# Patient Record
Sex: MALE | Race: BLACK OR AFRICAN AMERICAN | Employment: FULL TIME | URBAN - METROPOLITAN AREA
[De-identification: names, ages, dates, MRNs, and addresses within clinical notes are randomized per-mention and may not be internally consistent; named-entity substitution may affect disease eponyms.]

---

## 2018-07-23 ENCOUNTER — HOSPITAL ENCOUNTER (EMERGENCY)
Age: 28
Discharge: HOME | End: 2018-07-23
Attending: EMERGENCY MEDICINE
Payer: MEDICAID

## 2018-07-23 VITALS
OXYGEN SATURATION: 98 % | HEIGHT: 75 IN | RESPIRATION RATE: 10 BRPM | WEIGHT: 203.8 LBS | HEART RATE: 53 BPM | DIASTOLIC BLOOD PRESSURE: 91 MMHG | TEMPERATURE: 98.1 F | SYSTOLIC BLOOD PRESSURE: 133 MMHG | BODY MASS INDEX: 25.34 KG/M2

## 2018-07-23 DIAGNOSIS — K08.89 PAIN, DENTAL: Primary | ICD-10-CM

## 2018-07-23 PROCEDURE — 6370000000 HC RX 637 (ALT 250 FOR IP): Performed by: EMERGENCY MEDICINE

## 2018-07-23 PROCEDURE — 99282 EMERGENCY DEPT VISIT SF MDM: CPT

## 2018-07-23 RX ORDER — CLINDAMYCIN HYDROCHLORIDE 300 MG/1
300 CAPSULE ORAL 4 TIMES DAILY
Qty: 28 CAPSULE | Refills: 0 | Status: SHIPPED | OUTPATIENT
Start: 2018-07-23 | End: 2018-07-30

## 2018-07-23 RX ORDER — CLINDAMYCIN HYDROCHLORIDE 300 MG/1
300 CAPSULE ORAL ONCE
Status: COMPLETED | OUTPATIENT
Start: 2018-07-23 | End: 2018-07-23

## 2018-07-23 RX ORDER — IBUPROFEN 400 MG/1
800 TABLET ORAL ONCE
Status: COMPLETED | OUTPATIENT
Start: 2018-07-23 | End: 2018-07-23

## 2018-07-23 RX ADMIN — CLINDAMYCIN HYDROCHLORIDE 300 MG: 300 CAPSULE ORAL at 15:27

## 2018-07-23 RX ADMIN — IBUPROFEN 800 MG: 400 TABLET ORAL at 15:27

## 2018-07-23 ASSESSMENT — PAIN SCALES - GENERAL
PAINLEVEL_OUTOF10: 8
PAINLEVEL_OUTOF10: 8

## 2018-07-23 ASSESSMENT — PAIN DESCRIPTION - PAIN TYPE: TYPE: ACUTE PAIN

## 2018-07-23 NOTE — ED PROVIDER NOTES
clear. Sclera non icteric. No conjunctival injection  ENT: Upper and lower second molars show percussion tenderness with no obvious caries no subluxation. Tenderness noted along the gumline both upper and lower as well as posterior. No induration no fluctuance no erythema. No elevation of floor of mouth. Oropharynx clear. Airway patent. No stridor. No asymmetry. TM's clear. NECK: Supple  LUNGS: Clear. Equal breath sounds bilaterally. CARDIOVASCULAR: RRR. No murmurs rubs or gallops. pulses equal  SKIN: Warm and dry. Physical Exam     RADIOLOGY  No orders to display       PROCEDURES  Procedures    ED COURSE/MDM  Patient seen and evaluated. Old records reviewed if pertinent. Labs and imaging reviewed and results discussed with patient. I considered Dental cellulitis, dental abscess, irreversible pulpitis, periodontitis. Plan of care discussed with patient or family as appropriate. Patient or family in agreement with plan. If discharged, patient was given scripts for the following medications. Discharge Medication List as of 7/23/2018  3:10 PM      START taking these medications    Details   clindamycin (CLEOCIN) 300 MG capsule Take 1 capsule by mouth 4 times daily for 7 days, Disp-28 capsule, R-0Print             CLINICAL IMPRESSION  1. Pain, dental        Blood pressure (!) 133/91, pulse 53, temperature 98.1 °F (36.7 °C), temperature source Oral, resp. rate 10, height 6' 3\" (1.905 m), weight 92.4 kg (203 lb 12.8 oz), SpO2 98 %. DISPOSITION  Yue Duncan was discharged in stable condition.                    Akosua Toussaint MD  07/23/18 0690

## 2018-08-15 ENCOUNTER — HOSPITAL ENCOUNTER (EMERGENCY)
Age: 28
Discharge: HOME | End: 2018-08-15
Attending: EMERGENCY MEDICINE
Payer: MEDICAID

## 2018-08-15 VITALS
WEIGHT: 200 LBS | BODY MASS INDEX: 25.67 KG/M2 | TEMPERATURE: 98.3 F | HEART RATE: 62 BPM | SYSTOLIC BLOOD PRESSURE: 142 MMHG | HEIGHT: 74 IN | OXYGEN SATURATION: 98 % | RESPIRATION RATE: 18 BRPM | DIASTOLIC BLOOD PRESSURE: 88 MMHG

## 2018-08-15 DIAGNOSIS — K08.89 PAIN, DENTAL: Primary | ICD-10-CM

## 2018-08-15 PROCEDURE — 4500000022 HC ED LEVEL 2 PROCEDURE

## 2018-08-15 PROCEDURE — 6370000000 HC RX 637 (ALT 250 FOR IP): Performed by: PHYSICIAN ASSISTANT

## 2018-08-15 PROCEDURE — 2500000003 HC RX 250 WO HCPCS: Performed by: PHYSICIAN ASSISTANT

## 2018-08-15 PROCEDURE — 99282 EMERGENCY DEPT VISIT SF MDM: CPT

## 2018-08-15 RX ORDER — BUPIVACAINE HYDROCHLORIDE 2.5 MG/ML
30 INJECTION, SOLUTION EPIDURAL; INFILTRATION; INTRACAUDAL ONCE
Status: COMPLETED | OUTPATIENT
Start: 2018-08-15 | End: 2018-08-15

## 2018-08-15 RX ORDER — PENICILLIN V POTASSIUM 500 MG/1
500 TABLET ORAL 4 TIMES DAILY
Qty: 28 TABLET | Refills: 0 | Status: SHIPPED | OUTPATIENT
Start: 2018-08-15 | End: 2018-08-15

## 2018-08-15 RX ORDER — IBUPROFEN 400 MG/1
800 TABLET ORAL ONCE
Status: COMPLETED | OUTPATIENT
Start: 2018-08-15 | End: 2018-08-15

## 2018-08-15 RX ORDER — PENICILLIN V POTASSIUM 500 MG/1
500 TABLET ORAL 4 TIMES DAILY
Qty: 28 TABLET | Refills: 0 | Status: SHIPPED | OUTPATIENT
Start: 2018-08-15 | End: 2018-08-22

## 2018-08-15 RX ADMIN — BUPIVACAINE HYDROCHLORIDE 75 MG: 2.5 INJECTION, SOLUTION EPIDURAL; INFILTRATION; INTRACAUDAL; PERINEURAL at 15:24

## 2018-08-15 RX ADMIN — IBUPROFEN 800 MG: 400 TABLET ORAL at 15:24

## 2018-08-15 ASSESSMENT — PAIN SCALES - GENERAL
PAINLEVEL_OUTOF10: 10
PAINLEVEL_OUTOF10: 10

## 2018-08-15 NOTE — ED PROVIDER NOTES
[address] ENCOUNTER          PHYSICIAN ASSISTANT NOTE       Date of evaluation: 8/15/2018    Chief Complaint     Dental Pain    History of Present Illness     Adam Whitmore is a 32 y.o. male who presents with chief complaint of dental pain. Patient reports he has had dental pain to the right lower molar for the past two days. Reports that he has a hole in the tooth which has been present for a few months with acute exacerbation of pain the past two days. Reports pain when eating to this tooth. Reports feeling febrile, no documented temperatures. Denies trouble swallowing or shortness of breath. Review of Systems     Review of Systems   Constitutional: Positive for fever (subjective). Negative for chills, diaphoresis and fatigue. Past Medical, Surgical, Family, and Social History     He has a past medical history of Chronic chest pain and Migraines. He has no past surgical history on file. His family history is not on file. He reports that he has been smoking Cigarettes. He has been smoking about 1.00 pack per day. He has never used smokeless tobacco. He reports that he drinks about 3.0 oz of alcohol per week . He reports that he uses drugs, including Marijuana. Medications     Discharge Medication List as of 8/15/2018  4:11 PM      CONTINUE these medications which have NOT CHANGED    Details   ibuprofen (ADVIL) 200 MG CAPS Take 1 capsule by mouthHistorical Med             Allergies     He has No Known Allergies. Physical Exam     INITIAL VITALS: BP: (!) 142/88, Temp: 98.3 °F (36.8 °C), Pulse: 62, Resp: 18, SpO2: 98 %   Physical Exam   Constitutional: He is oriented to person, place, and time. He appears well-developed and well-nourished. No distress. HENT:   Head: Normocephalic and atraumatic. Mouth/Throat: Uvula is midline and mucous membranes are normal. No trismus in the jaw. Dental caries present. No dental abscesses or uvula swelling.  No

## 2018-08-15 NOTE — ED NOTES
Pt discharged to home. Pt verbalized understanding of discharged instructions. Pt left ED ambulatory without incident.       Tyree Zambrano RN  08/15/18 0641

## 2018-08-19 ENCOUNTER — HOSPITAL ENCOUNTER (EMERGENCY)
Age: 28
Discharge: HOME | End: 2018-08-20
Attending: EMERGENCY MEDICINE
Payer: MEDICAID

## 2018-08-19 VITALS
HEIGHT: 74 IN | WEIGHT: 200 LBS | DIASTOLIC BLOOD PRESSURE: 98 MMHG | RESPIRATION RATE: 14 BRPM | BODY MASS INDEX: 25.67 KG/M2 | HEART RATE: 66 BPM | SYSTOLIC BLOOD PRESSURE: 152 MMHG | OXYGEN SATURATION: 98 % | TEMPERATURE: 98.1 F

## 2018-08-19 DIAGNOSIS — K08.89 PAIN, DENTAL: Primary | ICD-10-CM

## 2018-08-19 PROCEDURE — 4500000022 HC ED LEVEL 2 PROCEDURE

## 2018-08-19 PROCEDURE — 99282 EMERGENCY DEPT VISIT SF MDM: CPT

## 2018-08-19 RX ORDER — BUPIVACAINE HYDROCHLORIDE 2.5 MG/ML
30 INJECTION, SOLUTION EPIDURAL; INFILTRATION; INTRACAUDAL ONCE
Status: COMPLETED | OUTPATIENT
Start: 2018-08-20 | End: 2018-08-20

## 2018-08-19 ASSESSMENT — PAIN SCALES - GENERAL: PAINLEVEL_OUTOF10: 10

## 2018-08-19 ASSESSMENT — PAIN DESCRIPTION - PAIN TYPE: TYPE: ACUTE PAIN

## 2018-08-19 ASSESSMENT — PAIN DESCRIPTION - ORIENTATION: ORIENTATION: LEFT;RIGHT

## 2018-08-19 ASSESSMENT — PAIN DESCRIPTION - DESCRIPTORS: DESCRIPTORS: STABBING;RADIATING;SHARP

## 2018-08-19 ASSESSMENT — PAIN DESCRIPTION - FREQUENCY: FREQUENCY: CONTINUOUS

## 2018-08-19 ASSESSMENT — PAIN DESCRIPTION - LOCATION: LOCATION: MOUTH

## 2018-08-20 PROCEDURE — 6370000000 HC RX 637 (ALT 250 FOR IP): Performed by: STUDENT IN AN ORGANIZED HEALTH CARE EDUCATION/TRAINING PROGRAM

## 2018-08-20 PROCEDURE — 2500000003 HC RX 250 WO HCPCS: Performed by: STUDENT IN AN ORGANIZED HEALTH CARE EDUCATION/TRAINING PROGRAM

## 2018-08-20 RX ORDER — IBUPROFEN 400 MG/1
TABLET ORAL
Status: DISCONTINUED
Start: 2018-08-20 | End: 2018-08-20 | Stop reason: HOSPADM

## 2018-08-20 RX ORDER — IBUPROFEN 200 MG
600 TABLET ORAL EVERY 8 HOURS PRN
Qty: 30 TABLET | Refills: 0 | Status: SHIPPED | OUTPATIENT
Start: 2018-08-20 | End: 2019-05-22 | Stop reason: SDUPTHER

## 2018-08-20 RX ADMIN — BUPIVACAINE HYDROCHLORIDE 75 MG: 2.5 INJECTION, SOLUTION EPIDURAL; INFILTRATION; INTRACAUDAL at 00:56

## 2018-08-20 RX ADMIN — IBUPROFEN 600 MG: 200 TABLET, FILM COATED ORAL at 00:07

## 2018-08-20 ASSESSMENT — PAIN SCALES - GENERAL
PAINLEVEL_OUTOF10: 10
PAINLEVEL_OUTOF10: 10

## 2018-08-20 NOTE — ED PROVIDER NOTES
[address] ENCOUNTER          EM RESIDENT NOTE       Date of evaluation: 8/19/2018    Chief Complaint     Dental Pain (bilateral, on abx for this )      History of Present Illness     Gust Curling is a 32 y.o. male who presents For evaluation of dental pain. Patient states that this has been ongoing for the past couple weeks. Patient states that he had a dental block done 4 days ago. He states that he went to see a dentist but they were not able to do anything since he was still taking his penicillin. He is returning for pain that is now also involving the left side. Patient denies any fevers or chills. He is doing able to eat and drink. Review of Systems     Review of Systems  No fever, chills, sweats. No chest pain, shortness of breath or vomiting. Past Medical, Surgical, Family, and Social History     He has a past medical history of Chronic chest pain and Migraines. He has no past surgical history on file. His family history is not on file. He reports that he has been smoking Cigarettes. He has been smoking about 1.00 pack per day. He has never used smokeless tobacco. He reports that he drinks about 3.0 oz of alcohol per week . He reports that he uses drugs, including Marijuana. Medications     Previous Medications    IBUPROFEN (ADVIL) 200 MG CAPS    Take 1 capsule by mouth    PENICILLIN V POTASSIUM (VEETID) 500 MG TABLET    Take 1 tablet by mouth 4 times daily for 7 days       Allergies     He has No Known Allergies. Physical Exam     INITIAL VITALS: BP: (!) 152/98, Temp: 98.1 °F (36.7 °C), Pulse: 66, Resp: 14, SpO2: 98 %   Physical Exam   Constitutional: He appears well-developed and well-nourished. HENT:   Head: Normocephalic and atraumatic. Mouth/Throat: Oropharynx is clear and moist.   Tenderness to palpation of the right lower molar. Cavity is seen to that tooth. No evidence of abscess or fluctuant mass.    Cardiovascular: Normal rate and regular rhythm. Vitals reviewed. Diagnostic Results       RADIOLOGY:  No orders to display       LABS:   No results found for this visit on 08/19/18. RECENT VITALS:  BP: (!) 152/98, Temp: 98.1 °F (36.7 °C), Pulse: 66, Resp: 14, SpO2: 98 %     Procedures     Dental Nerve Block Procedure  Date/Time: 8/20/2018 12:05 AM  Performed by: Xavier Rich  Authorized by: Con Ramirez   Consent: Verbal consent obtained. Consent given by: patient  Patient understanding: patient states understanding of the procedure being performed  Patient consent: the patient's understanding of the procedure matches consent given  Procedure consent: procedure consent matches procedure scheduled  Relevant documents: relevant documents present and verified  Site marked: the operative site was marked  Imaging studies: imaging studies not available  Patient identity confirmed: verbally with patient and arm band  Local anesthesia used: yes  Anesthesia: local infiltration    Anesthesia:  Local anesthesia used: yes  Local Anesthetic: bupivacaine 0.25% with epinephrine  Anesthetic total: 6 mL    Sedation:  Patient sedated: no  Patient tolerance: Patient tolerated the procedure well with no immediate complications          ED Course     Nursing Notes, Past Medical Hx, Past Surgical Hx, Social Hx, Allergies, and Family Hx were reviewed. The patient was given the following medications:  No orders of the defined types were placed in this encounter. CONSULTS:  None    MEDICAL DECISION MAKING / ASSESSMENT / Vik Stacie is a 32 y.o. male with a history of dental cavities and presents for evaluation of dental pain. Patient states he was in 4 days ago and had a dental block done on the right side. He started having pain on the left side the next day. The patient went to the dentist and was told to finish his antibiotic course. He is here today because his pain is back. Upon arrival the patient was afebrile.   He

## 2019-05-22 ENCOUNTER — APPOINTMENT (OUTPATIENT)
Dept: GENERAL RADIOLOGY | Age: 29
End: 2019-05-22
Payer: MEDICAID

## 2019-05-22 ENCOUNTER — HOSPITAL ENCOUNTER (EMERGENCY)
Age: 29
Discharge: HOME | End: 2019-05-22
Attending: EMERGENCY MEDICINE
Payer: MEDICAID

## 2019-05-22 VITALS
OXYGEN SATURATION: 98 % | BODY MASS INDEX: 28.43 KG/M2 | WEIGHT: 221.5 LBS | HEIGHT: 74 IN | TEMPERATURE: 98.9 F | DIASTOLIC BLOOD PRESSURE: 82 MMHG | SYSTOLIC BLOOD PRESSURE: 149 MMHG | HEART RATE: 92 BPM | RESPIRATION RATE: 12 BRPM

## 2019-05-22 DIAGNOSIS — S93.401A SPRAIN OF RIGHT ANKLE, UNSPECIFIED LIGAMENT, INITIAL ENCOUNTER: Primary | ICD-10-CM

## 2019-05-22 PROCEDURE — 73610 X-RAY EXAM OF ANKLE: CPT

## 2019-05-22 PROCEDURE — 73590 X-RAY EXAM OF LOWER LEG: CPT

## 2019-05-22 PROCEDURE — 99283 EMERGENCY DEPT VISIT LOW MDM: CPT

## 2019-05-22 RX ORDER — IBUPROFEN 200 MG
600 TABLET ORAL EVERY 8 HOURS PRN
Qty: 30 TABLET | Refills: 0 | Status: SHIPPED | OUTPATIENT
Start: 2019-05-22 | End: 2020-07-12

## 2019-05-22 ASSESSMENT — PAIN DESCRIPTION - LOCATION
LOCATION: ANKLE
LOCATION: ANKLE

## 2019-05-22 ASSESSMENT — PAIN DESCRIPTION - ORIENTATION
ORIENTATION: RIGHT
ORIENTATION: RIGHT

## 2019-05-22 ASSESSMENT — PAIN DESCRIPTION - DESCRIPTORS: DESCRIPTORS: TIGHTNESS

## 2019-05-22 ASSESSMENT — PAIN SCALES - GENERAL
PAINLEVEL_OUTOF10: 7
PAINLEVEL_OUTOF10: 7

## 2019-05-22 ASSESSMENT — PAIN DESCRIPTION - PAIN TYPE
TYPE: ACUTE PAIN
TYPE: ACUTE PAIN

## 2019-05-22 NOTE — ED PROVIDER NOTES
Emergency Physician Note    Chief Complaint  Ankle Injury       History of Present Illness  Alex Ibrahim is a 29 y.o. male who presents to the ED for ankle injury. Patient reports 2 days ago he injured his right foot while wearing socks on a porch and seemed to slip. Since then he's had pain with range of motion of the right foot and ankle and the pain seems to go into the calf as well. He denies any other injuries. 10 systems reviewed, pertinent positives per HPI otherwise noted to be negative    I have reviewed the following from the nursing documentation:      Prior to Admission medications    Medication Sig Start Date End Date Taking? Authorizing Provider   ibuprofen (ADVIL;MOTRIN) 200 MG tablet Take 3 tablets by mouth every 8 hours as needed for Pain 8/20/18  Yes Kristina Reeves MD       Allergies as of 05/22/2019    (No Known Allergies)       Past Medical History:   Diagnosis Date    Chronic chest pain     Migraines         Surgical History: History reviewed. No pertinent surgical history. Family History:  History reviewed. No pertinent family history.     Social History     Socioeconomic History    Marital status: Single     Spouse name: Not on file    Number of children: Not on file    Years of education: Not on file    Highest education level: Not on file   Occupational History    Not on file   Social Needs    Financial resource strain: Not on file    Food insecurity:     Worry: Not on file     Inability: Not on file    Transportation needs:     Medical: Not on file     Non-medical: Not on file   Tobacco Use    Smoking status: Current Every Day Smoker     Packs/day: 1.00     Types: Cigarettes    Smokeless tobacco: Never Used    Tobacco comment: daily   Substance and Sexual Activity    Alcohol use: Yes     Comment: 2 times week    Drug use: Yes     Types: Marijuana     Comment: every other day    Sexual activity: Yes     Partners: Female   Lifestyle    Physical activity: Days per week: Not on file     Minutes per session: Not on file    Stress: Not on file   Relationships    Social connections:     Talks on phone: Not on file     Gets together: Not on file     Attends [employer] service: Not on file     Active member of club or organization: Not on file     Attends meetings of clubs or organizations: Not on file     Relationship status: Not on file    Intimate partner violence:     Fear of current or ex partner: Not on file     Emotionally abused: Not on file     Physically abused: Not on file     Forced sexual activity: Not on file   Other Topics Concern    Not on file   Social History Narrative    Not on file       Nursing notes reviewed. ED Triage Vitals [05/22/19 1852]   Enc Vitals Group      BP (!) 149/82      Pulse 92      Resp 12      Temp 98.9 °F (37.2 °C)      Temp Source Oral      SpO2 98 %      Weight 221 lb 8 oz (100.5 kg)      Height 6' 2\" (1.88 m)      Head Circumference       Peak Flow       Pain Score       Pain Loc       Pain Edu? Excl. in [address]? GENERAL:  Awake, alert. Well developed, well nourished with no apparent distress. HENT:  Normocephalic, Atraumatic, moist mucous membranes. EXTREMITIES:  Normal range of motion, no edema, tender over the proximal fibula and lateral calf, no Achilles tenderness or defect, negative Laboy test, pain elicited also with axial loading of the ankle joint, neurovascularly intact to the toes, no deformity, distal pulses present. BACK:  No tenderness. SKIN: Warm, dry and intact. NEUROLOGIC: Normal mental status. Moving all extremities to command. RADIOLOGY  X-RAYS:  I have reviewed radiologic plain film image(s). ALL OTHER NON-PLAIN FILM IMAGES SUCH AS CT, ULTRASOUND AND MRI HAVE BEEN READ BY THE RADIOLOGIST. XR ANKLE RIGHT (MIN 3 VIEWS)   Final Result      Soft tissue swelling over lateral malleolus. No fracture or dislocation.             Right tibia fibula 2 view      HISTORY: 29year-old the [address] [place] [address] dictation system. I created this record but it may contain dictation errors.           Brian Field MD  05/23/19 4307

## 2020-07-12 ENCOUNTER — HOSPITAL ENCOUNTER (EMERGENCY)
Age: 30
Discharge: HOME | End: 2020-07-12
Attending: EMERGENCY MEDICINE

## 2020-07-12 VITALS
WEIGHT: 229.9 LBS | DIASTOLIC BLOOD PRESSURE: 111 MMHG | RESPIRATION RATE: 17 BRPM | OXYGEN SATURATION: 98 % | BODY MASS INDEX: 29.52 KG/M2 | HEART RATE: 90 BPM | TEMPERATURE: 98.6 F | SYSTOLIC BLOOD PRESSURE: 161 MMHG

## 2020-07-12 PROCEDURE — 99282 EMERGENCY DEPT VISIT SF MDM: CPT

## 2020-07-12 RX ORDER — IBUPROFEN 600 MG/1
600 TABLET ORAL EVERY 8 HOURS PRN
Qty: 20 TABLET | Refills: 0 | Status: SHIPPED | OUTPATIENT
Start: 2020-07-12

## 2020-07-12 RX ORDER — PENICILLIN V POTASSIUM 500 MG/1
500 TABLET ORAL 4 TIMES DAILY
Qty: 40 TABLET | Refills: 0 | Status: SHIPPED | OUTPATIENT
Start: 2020-07-12 | End: 2020-07-22

## 2020-07-12 ASSESSMENT — PAIN DESCRIPTION - PAIN TYPE: TYPE: ACUTE PAIN

## 2020-07-12 ASSESSMENT — PAIN DESCRIPTION - LOCATION: LOCATION: THROAT

## 2020-07-13 ENCOUNTER — CARE COORDINATION (OUTPATIENT)
Dept: CARE COORDINATION | Age: 30
End: 2020-07-13

## 2020-07-13 NOTE — CARE COORDINATION
Patient contacted regarding Julius Pruitt. Discussed COVID-19 related testing which was not done at this time. Test results were not done. Patient informed of results, if available? N/A    Care Transition Nurse/ Ambulatory Care Manager contacted the patient by telephone to perform post discharge assessment. Verified name and  with patient as identifiers. Provided introduction to self, and explanation of the CTN/ACM role, and reason for call due to risk factors for infection and/or exposure to COVID-19. Symptoms reviewed with patient who verbalized the following symptoms: no new symptoms and no worsening symptoms. Due to no new or worsening symptoms encounter was not routed to provider for escalation. Discussed follow-up appointments. If no appointment was previously scheduled, appointment scheduling offered: Yes. [address] spoke with Gamaliel Glover and he agreed to contact pt to schedule. [address] follow up appointment(s):   Non-[facility] follow up appointment(s):      Patient has following risk factors of: no known risk factors. CTN/ACM reviewed discharge instructions, medical action plan and red flags such as increased shortness of breath, increasing fever and signs of decompensation with patient who verbalized understanding. Discussed exposure protocols and quarantine with CDC Guidelines What to do if you are sick with coronavirus disease .  Patient was given an opportunity for questions and concerns. The patient agrees to contact the [facility] exposure line [phone] and PCP office for questions related to their healthcare. CTN/ACM provided contact information for future needs.     Reviewed and educated patient on any new and changed medications related to discharge diagnosis     Patient/family/caregiver given information for [facility] Loop and agrees to enroll no  Patient's preferred e-mail:  no  Patient's preferred phone number:  Based on Loop alert triggers, patient will be contacted by nurse care manager for worsening symptoms. Plan for follow-up call in 5-7 days based on severity of symptoms and risk factors. TAYLOR spoke with Viraj moya. He was agreeable to schedule f/u at [facility] with a new PCP. Horace Romano at the [facility] will contact Viraj moya to schedule. He was agreeable to contact [facility] line with questions. He has started his PCN and Ibuprofen, Tylenol . Denies fever but reports sweats and chills during the night. Verbalized understanding of symptoms to monitor and follow up plan. Made aware that ED f/u visit needed.

## 2020-07-20 ENCOUNTER — CARE COORDINATION (OUTPATIENT)
Dept: CARE COORDINATION | Age: 30
End: 2020-07-20

## 2021-12-08 ENCOUNTER — HOSPITAL ENCOUNTER (EMERGENCY)
Age: 31
Discharge: HOME | End: 2021-12-08
Attending: EMERGENCY MEDICINE

## 2021-12-08 VITALS
BODY MASS INDEX: 29.44 KG/M2 | WEIGHT: 229.44 LBS | OXYGEN SATURATION: 99 % | HEIGHT: 74 IN | TEMPERATURE: 98.3 F | DIASTOLIC BLOOD PRESSURE: 102 MMHG | SYSTOLIC BLOOD PRESSURE: 154 MMHG | RESPIRATION RATE: 12 BRPM | HEART RATE: 70 BPM

## 2021-12-08 DIAGNOSIS — N34.2 URETHRITIS: Primary | ICD-10-CM

## 2021-12-08 DIAGNOSIS — A63.0 GENITAL WARTS: ICD-10-CM

## 2021-12-08 LAB
BACTERIA: ABNORMAL /HPF
BILIRUBIN URINE: NEGATIVE
BLOOD, URINE: NEGATIVE
CLARITY: CLEAR
COLOR: YELLOW
EPITHELIAL CELLS, UA: ABNORMAL /HPF (ref 0–5)
GLUCOSE URINE: NEGATIVE MG/DL
KETONES, URINE: NEGATIVE MG/DL
LEUKOCYTE ESTERASE, URINE: ABNORMAL
MICROSCOPIC EXAMINATION: YES
NITRITE, URINE: NEGATIVE
PH UA: 6 (ref 5–8)
PROTEIN UA: NEGATIVE MG/DL
RBC UA: ABNORMAL /HPF (ref 0–4)
SPECIFIC GRAVITY UA: 1.02 (ref 1–1.03)
URINE TYPE: ABNORMAL
UROBILINOGEN, URINE: 0.2 E.U./DL
WBC UA: ABNORMAL /HPF (ref 0–5)

## 2021-12-08 PROCEDURE — 6370000000 HC RX 637 (ALT 250 FOR IP): Performed by: EMERGENCY MEDICINE

## 2021-12-08 PROCEDURE — 87591 N.GONORRHOEAE DNA AMP PROB: CPT

## 2021-12-08 PROCEDURE — 99285 EMERGENCY DEPT VISIT HI MDM: CPT

## 2021-12-08 PROCEDURE — 81001 URINALYSIS AUTO W/SCOPE: CPT

## 2021-12-08 PROCEDURE — 6360000002 HC RX W HCPCS: Performed by: EMERGENCY MEDICINE

## 2021-12-08 PROCEDURE — 96372 THER/PROPH/DIAG INJ SC/IM: CPT

## 2021-12-08 PROCEDURE — 87491 CHLMYD TRACH DNA AMP PROBE: CPT

## 2021-12-08 PROCEDURE — 2500000003 HC RX 250 WO HCPCS: Performed by: EMERGENCY MEDICINE

## 2021-12-08 RX ORDER — METRONIDAZOLE 500 MG/1
2000 TABLET ORAL ONCE
Status: COMPLETED | OUTPATIENT
Start: 2021-12-08 | End: 2021-12-08

## 2021-12-08 RX ORDER — IMIQUIMOD 12.5 MG/.25G
CREAM TOPICAL
Qty: 24 EACH | Refills: 1 | Status: SHIPPED | OUTPATIENT
Start: 2021-12-08 | End: 2023-01-18

## 2021-12-08 RX ORDER — AZITHROMYCIN 250 MG/1
1000 TABLET, FILM COATED ORAL ONCE
Status: COMPLETED | OUTPATIENT
Start: 2021-12-08 | End: 2021-12-08

## 2021-12-08 RX ADMIN — METRONIDAZOLE 2000 MG: 500 TABLET ORAL at 17:42

## 2021-12-08 RX ADMIN — LIDOCAINE HYDROCHLORIDE 250 MG: 10 INJECTION, SOLUTION INFILTRATION; PERINEURAL at 17:44

## 2021-12-08 RX ADMIN — AZITHROMYCIN MONOHYDRATE 1000 MG: 250 TABLET ORAL at 17:42

## 2021-12-08 NOTE — ED PROVIDER NOTES
Emergency Department Provider Note  Location: [address] [place] [address]  12/8/2021     Patient Identification  Huey Scott is a 32 y.o. male    Chief Complaint  Exposure to STD      Mode of Arrival  private car    HPI  (History provided by patient)  This is a 32 y.o. male presented today for penile discharge and exposure to STD. Patient said he got a phone call from his ex-girlfriend about 3-4 days ago that she tested positive for trichomonas. Patient said he noticed slight dysuria and penile discharge. He also noticed some lesions on the side of the penis. Denies testicular pain or scrotal swelling. No fever. No hematuria. ROS  Review of Systems   Constitutional: Negative for fever. Gastrointestinal: Negative for abdominal pain, nausea, rectal pain and vomiting. Genitourinary: Positive for dysuria and penile discharge. Negative for hematuria, scrotal swelling and testicular pain. Musculoskeletal: Negative for back pain. I have reviewed the following nursing documentation:  Allergies: No Known Allergies    Past medical history:  has a past medical history of Chronic chest pain and Migraines. Past surgical history:  has no past surgical history on file. Home medications:   Prior to Admission medications    Medication Sig Start Date End Date Taking? Authorizing Provider   ibuprofen (IBU) 600 MG tablet Take 1 tablet by mouth every 8 hours as needed for Pain or Fever (with food) 7/12/20   Pastor Mccord MD       Social history:  reports that he has been smoking cigarettes. He has been smoking about 1.00 pack per day. He has never used smokeless tobacco. He reports current alcohol use. He reports current drug use. Drug: Marijuana [place]). Family history:  History reviewed. No pertinent family history.     Exam  ED Triage Vitals [12/08/21 1554]   BP Temp Temp Source Pulse Resp SpO2 Height Weight   (!) 154/102 98.3 °F (36.8 °C) Oral 70 12 99 % 6' 2\" (1.88 m) 229 lb 7 oz (104.1 kg) Physical Exam  Vitals and nursing note reviewed. Constitutional:       General: He is not in acute distress. Appearance: Normal appearance. He is well-developed. He is not diaphoretic. HENT:      Head: Normocephalic and atraumatic. Eyes:      General: No scleral icterus. Right eye: No discharge. Left eye: No discharge. Neck:      Trachea: No tracheal deviation. Pulmonary:      Effort: Pulmonary effort is normal. No respiratory distress. Abdominal:      General: There is no distension. Tenderness: There is no abdominal tenderness. There is no guarding or rebound. Genitourinary:     Penis: Circumcised. Lesions (verruca lesions noted on the lateral side of the penis) present. Testes:         Right: Tenderness or swelling not present. Left: Tenderness or swelling not present. Lymphadenopathy:      Lower Body: No right inguinal adenopathy. No left inguinal adenopathy. Skin:     General: Skin is warm and dry. Coloration: Skin is not pale. Comments: Genital lesions concerning for wart on the penis   Neurological:      General: No focal deficit present. Mental Status: He is alert and oriented to person, place, and time. Cranial Nerves: No dysarthria. Motor: Motor function is intact.    Psychiatric:         Behavior: Behavior normal.               MDM/ED Course  ED Medication Orders (From admission, onward)    Start Ordered     Status Ordering Provider    12/08/21 1715 12/08/21 1706  azithromycin (ZITHROMAX) tablet 1,000 mg  ONCE        Question:  Antimicrobial Indications  Answer:  STD infection    Last MAR action: Given - by Kathleen Black on 12/08/21 at [facility]    12/08/21 1715 12/08/21 1706  cefTRIAXone (ROCEPHIN) 250 mg in lidocaine 1 % 0.5 mL IM Injection  ONCE        Question:  Antimicrobial Indications  Answer:  STD infection    Last MAR action: Given - by Kathleen Black on 12/08/21 at [facility]    12/08/21 1715 12/08/21 1706  metroNIDAZOLE (FLAGYL) tablet 2,000 mg  ONCE        Question:  Antimicrobial Indications  Answer:  STD infection    Last MAR action: Given - by Grant Alexander on 12/08/21 at [facility] [place], [place]          - Patient seen and evaluated in room 6.  32 y.o. male presented for STD treatment after his ex-girlfriend tested positive for STD (trichomonas). Since patient is not sure if ex-girlfriend also tested positive for gonorrhea and chlamydia, we agreed to treat all 3.  - in addition, on exam, patient has lesions that are concerning for HPV infection, genital wart. We will initiate treatment but will refer the patient to dermatology for follow-up and further evaluation.   - Return precautions also discussed. patient verbalized understanding of care plan and agreed to follow-up with PCP and dermatology as advised. I estimate there is LOW risk for TESTICULAR TORSION, INCARCERATED HERNIA, AMY'S GANGRENE, PHISMOSIS, PARAPHISMOSIS, PENILE FRACTURE, and PRIAPISM, thus I consider the discharge disposition reasonable. Also, there is no evidence or peritonitis, sepsis, or toxicity. Trinity Finn and I have discussed the diagnosis and risks, and we agree with discharging home to follow-up with their primary doctor. We also discussed returning to the Emergency Department immediately if new or worsening symptoms occur. We have discussed the symptoms which are most concerning (e.g., bloody stool, fever, changing or worsening pain, vomiting) that necessitate immediate return. Clinical Impression:  1. Urethritis    2. Genital warts        Disposition:  Discharge to home in good condition. Blood pressure (!) 154/102, pulse 70, temperature 98.3 °F (36.8 °C), temperature source Oral, resp. rate 12, height 6' 2\" (1.88 m), weight 229 lb 7 oz (104.1 kg), SpO2 99 %. Patient was given scripts for the following medications. I counseled patient how to take these medications.    Discharge Medication List as of 12/8/2021 6:02 PM      START taking these medications    Details   imiquimod (ALDARA) 5 % cream Apply topically three times a week (for example: Monday, Wednesday, Friday) for 6 weeks, Disp-24 each, R-1, Print             Disposition referral (if applicable):  Ana Ochoa  [phone]  Schedule an appointment as soon as possible for a visit   You have been referred to primary care for follow-up    John Rivas MD  [address] [address]  [phone]    Schedule an appointment as soon as possible for a visit   dermatology - for your genital wart        This chart was generated in part by using Dragon Dictation system and may contain errors related to that system including errors in grammar, punctuation, and spelling, as well as words and phrases that may be inappropriate. If there are any questions or concerns please feel free to contact the dictating provider for clarification.      Terrell Garza MD  [address] Amos Gilbert MD  12/13/21 1323

## 2021-12-10 LAB
C. TRACHOMATIS DNA ,URINE: NEGATIVE
N. GONORRHOEAE DNA, URINE: NEGATIVE

## 2021-12-13 ASSESSMENT — ENCOUNTER SYMPTOMS
RECTAL PAIN: 0
NAUSEA: 0
ABDOMINAL PAIN: 0
VOMITING: 0
BACK PAIN: 0

## 2023-08-25 ENCOUNTER — HOSPITAL ENCOUNTER (EMERGENCY)
Age: 33
Discharge: HOME | End: 2023-08-25
Attending: EMERGENCY MEDICINE
Payer: COMMERCIAL

## 2023-08-25 VITALS
SYSTOLIC BLOOD PRESSURE: 147 MMHG | HEIGHT: 74 IN | TEMPERATURE: 98.5 F | DIASTOLIC BLOOD PRESSURE: 100 MMHG | RESPIRATION RATE: 10 BRPM | WEIGHT: 214.8 LBS | HEART RATE: 69 BPM | BODY MASS INDEX: 27.57 KG/M2 | OXYGEN SATURATION: 100 %

## 2023-08-25 DIAGNOSIS — Z20.2 STD EXPOSURE: Primary | ICD-10-CM

## 2023-08-25 LAB
BACTERIA URNS QL MICRO: ABNORMAL /HPF
BILIRUB UR QL STRIP.AUTO: NEGATIVE
CLARITY UR: CLEAR
COLOR UR: YELLOW
EPI CELLS #/AREA URNS HPF: ABNORMAL /HPF (ref 0–5)
GLUCOSE UR STRIP.AUTO-MCNC: NEGATIVE MG/DL
HGB UR QL STRIP.AUTO: NEGATIVE
KETONES UR STRIP.AUTO-MCNC: NEGATIVE MG/DL
LEUKOCYTE ESTERASE UR QL STRIP.AUTO: ABNORMAL
NITRITE UR QL STRIP.AUTO: NEGATIVE
PH UR STRIP.AUTO: 7 [PH] (ref 5–8)
PROT UR STRIP.AUTO-MCNC: NEGATIVE MG/DL
RBC #/AREA URNS HPF: ABNORMAL /HPF (ref 0–4)
SP GR UR STRIP.AUTO: 1.01 (ref 1–1.03)
UA DIPSTICK W REFLEX MICRO PNL UR: YES
URN SPEC COLLECT METH UR: ABNORMAL
UROBILINOGEN UR STRIP-ACNC: 1 E.U./DL
WBC #/AREA URNS HPF: ABNORMAL /HPF (ref 0–5)

## 2023-08-25 PROCEDURE — 6360000002 HC RX W HCPCS: Performed by: EMERGENCY MEDICINE

## 2023-08-25 PROCEDURE — 6370000000 HC RX 637 (ALT 250 FOR IP): Performed by: EMERGENCY MEDICINE

## 2023-08-25 PROCEDURE — 81001 URINALYSIS AUTO W/SCOPE: CPT

## 2023-08-25 PROCEDURE — 99284 EMERGENCY DEPT VISIT MOD MDM: CPT

## 2023-08-25 PROCEDURE — 96372 THER/PROPH/DIAG INJ SC/IM: CPT

## 2023-08-25 PROCEDURE — 87591 N.GONORRHOEAE DNA AMP PROB: CPT

## 2023-08-25 PROCEDURE — 87491 CHLMYD TRACH DNA AMP PROBE: CPT

## 2023-08-25 RX ORDER — CEFTRIAXONE 500 MG/1
500 INJECTION, POWDER, FOR SOLUTION INTRAMUSCULAR; INTRAVENOUS ONCE
Status: COMPLETED | OUTPATIENT
Start: 2023-08-25 | End: 2023-08-25

## 2023-08-25 RX ORDER — METRONIDAZOLE 500 MG/1
500 TABLET ORAL 2 TIMES DAILY
Qty: 14 TABLET | Refills: 0 | Status: SHIPPED | OUTPATIENT
Start: 2023-08-25 | End: 2023-09-01

## 2023-08-25 RX ORDER — DOXYCYCLINE HYCLATE 100 MG
100 TABLET ORAL 2 TIMES DAILY
Qty: 20 TABLET | Refills: 0 | Status: SHIPPED | OUTPATIENT
Start: 2023-08-25 | End: 2023-09-04

## 2023-08-25 RX ORDER — DOXYCYCLINE 100 MG/1
100 CAPSULE ORAL ONCE
Status: COMPLETED | OUTPATIENT
Start: 2023-08-25 | End: 2023-08-25

## 2023-08-25 RX ORDER — METRONIDAZOLE 500 MG/1
500 TABLET ORAL ONCE
Status: COMPLETED | OUTPATIENT
Start: 2023-08-25 | End: 2023-08-25

## 2023-08-25 RX ADMIN — DOXYCYCLINE 100 MG: 100 CAPSULE ORAL at 13:08

## 2023-08-25 RX ADMIN — CEFTRIAXONE SODIUM 500 MG: 500 INJECTION, POWDER, FOR SOLUTION INTRAMUSCULAR; INTRAVENOUS at 13:08

## 2023-08-25 RX ADMIN — METRONIDAZOLE 500 MG: 500 TABLET ORAL at 13:08

## 2023-08-25 ASSESSMENT — PAIN - FUNCTIONAL ASSESSMENT: PAIN_FUNCTIONAL_ASSESSMENT: NONE - DENIES PAIN

## 2023-08-25 NOTE — DISCHARGE INSTRUCTIONS
Please take your medications as prescribed. If your symptoms worsen despite treatment please come back to the ER for repeat evaluation. please refrain from sexual activity until you are fully treated which is approximately 7 days.

## 2023-08-25 NOTE — ED NOTES
Patient given prescription, discharge instructions verbal and written, patient verbalized understanding. Alert/oriented X4, Clear speech.   Patient exhibits no distress, ambulates with steady gait per self leaving unit, no further request.      Eldon Patel RN  08/25/23 2365

## 2023-08-25 NOTE — ED PROVIDER NOTES
[address] ENCOUNTER      Pt Name: Darian Winkler  MRN: [id number]  [address] 1990  Date of evaluation: 8/25/2023  Provider: Luly Cuello MD    CHIEF COMPLAINT       Chief Complaint   Patient presents with    Exposure to STD         HISTORY OF PRESENT ILLNESS   (Location/Symptom, Timing/Onset, Context/Setting, Quality, Duration, Modifying Factors, Severity)  Note limiting factors. Darian Winkler is a 28 y.o. male who presents to the emergency department with the chief complaint of   Chief Complaint   Patient presents with    Exposure to STD   . 26-year-old male with no significant past medical history presents to the ER for treatment of STD exposure. Patient states he was informed that his partner tested positive trichomonas or possibly another STD and that he needed treatment. Patient is describing mild discharge that is yellow from his penis. Patient denies pain, fevers, chills, nausea, vomiting testicular pain. Nursing Notes were reviewed. REVIEW OF SYSTEMS    (2-9 systems for level 4, 10 or more for level 5)     Review of Systems   All other systems reviewed and are negative. Except as noted above the remainder of the review of systems was reviewed and negative. PAST MEDICAL HISTORY     Past Medical History:   Diagnosis Date    Chronic chest pain     Migraines          SURGICAL HISTORY     History reviewed. No pertinent surgical history. CURRENT MEDICATIONS       Discharge Medication List as of 8/25/2023  1:13 PM        CONTINUE these medications which have NOT CHANGED    Details   ibuprofen (IBU) 600 MG tablet Take 1 tablet by mouth every 8 hours as needed for Pain or Fever (with food), Disp-20 tablet,R-0Print             ALLERGIES     Patient has no known allergies. FAMILY HISTORY     History reviewed. No pertinent family history.        SOCIAL HISTORY       Social History     Socioeconomic History    Marital status: Single monohydrate (MONODOX) capsule 100 mg (100 mg Oral Given 8/25/23 1308)   metroNIDAZOLE (FLAGYL) tablet 500 mg (500 mg Oral Given 8/25/23 1308)         Patient was reassessed multiple times while in the emergency department without significant clinical change at time of discharge    [unfilled]          I am the Primary Clinician of Record. MDM  Number of Diagnoses or Management Options  STD exposure  Diagnosis management comments: Patient with STD exposure. Patient was informed to be tested and treated for possible trichomonas. Patient is unsure what STD he was to be treated for. Will treat for trichomonas in addition to gonorrhea chlamydia. For patient to have partner tested and treated and refrain from sexual course for 7 days. Patient voiced understanding. CRITICAL CARE TIME         PROCEDURES:  Unless otherwise noted below, none     Procedures    FINAL IMPRESSION      1. STD exposure          DISPOSITION/PLAN   DISPOSITION Decision To Discharge 08/25/2023 12:50:45 PM      PATIENT REFERRED TO:  No follow-up provider specified. DISCHARGE MEDICATIONS:  Discharge Medication List as of 8/25/2023  1:13 PM        START taking these medications    Details   doxycycline hyclate (VIBRA-TABS) 100 MG tablet Take 1 tablet by mouth 2 times daily for 10 days, Disp-20 tablet, R-0Print      metroNIDAZOLE (FLAGYL) 500 MG tablet Take 1 tablet by mouth in the morning and at bedtime for 7 days, Disp-14 tablet, R-0Print           Controlled Substances Monitoring:     No flowsheet data found. (Please note that portions of this note were completed with a voice recognition program.  Efforts were made to edit the dictations but occasionally words are mis-transcribed. )    Micah Salcedo MD (electronically signed)  Attending Emergency Physician            Micah Salcedo MD  08/25/23 [id number]

## 2023-08-29 LAB
C TRACH DNA UR QL NAA+PROBE: NEGATIVE
N GONORRHOEA DNA UR QL NAA+PROBE: NEGATIVE

## 2024-06-13 ENCOUNTER — HOSPITAL ENCOUNTER (EMERGENCY)
Age: 34
Discharge: HOME | End: 2024-06-13
Attending: EMERGENCY MEDICINE
Payer: COMMERCIAL

## 2024-06-13 VITALS
HEART RATE: 89 BPM | OXYGEN SATURATION: 98 % | BODY MASS INDEX: 26.96 KG/M2 | SYSTOLIC BLOOD PRESSURE: 150 MMHG | WEIGHT: 210 LBS | RESPIRATION RATE: 18 BRPM | TEMPERATURE: 98.3 F | DIASTOLIC BLOOD PRESSURE: 99 MMHG

## 2024-06-13 DIAGNOSIS — K04.7 DENTAL INFECTION: Primary | ICD-10-CM

## 2024-06-13 PROCEDURE — 6370000000 HC RX 637 (ALT 250 FOR IP): Performed by: EMERGENCY MEDICINE

## 2024-06-13 PROCEDURE — 99283 EMERGENCY DEPT VISIT LOW MDM: CPT

## 2024-06-13 RX ORDER — NAPROXEN 500 MG/1
500 TABLET ORAL 2 TIMES DAILY
Qty: 20 TABLET | Refills: 0 | Status: SHIPPED | OUTPATIENT
Start: 2024-06-13 | End: 2024-06-23

## 2024-06-13 RX ORDER — CLINDAMYCIN HYDROCHLORIDE 300 MG/1
300 CAPSULE ORAL 4 TIMES DAILY
Qty: 40 CAPSULE | Refills: 0 | Status: SHIPPED | OUTPATIENT
Start: 2024-06-13 | End: 2024-06-23

## 2024-06-13 RX ORDER — NAPROXEN 250 MG/1
500 TABLET ORAL ONCE
Status: DISCONTINUED | OUTPATIENT
Start: 2024-06-13 | End: 2024-06-13 | Stop reason: HOSPADM

## 2024-06-13 RX ORDER — CLINDAMYCIN HYDROCHLORIDE 300 MG/1
300 CAPSULE ORAL ONCE
Status: COMPLETED | OUTPATIENT
Start: 2024-06-13 | End: 2024-06-13

## 2024-06-13 RX ADMIN — CLINDAMYCIN HYDROCHLORIDE 300 MG: 300 CAPSULE ORAL at 01:51

## 2024-06-13 NOTE — ED PROVIDER NOTES
[facility] EMERGENCY DEPARTMENT  eMERGENCY dEPARTMENT eNCOUnter      Pt Name: Osmin Bales  MRN: [id number]  Birthdate 1990  Date of evaluation: 6/13/2024  Provider: Jorge Luciano MD  PCP: No primary care provider on file.      CHIEF COMPLAINT       Abscess and gum    HISTORY OFPRESENT ILLNESS   (Location/Symptom, Timing/Onset, Context/Setting, Quality, Duration, Modifying Factors,Severity)  Note limiting factors.     Osmin Bales is a 33 y.o. male presents with complaints that he thinks he has an abscessed tooth in his left upper posterior molar has been there for couple of weeks.  Denies any fever he is not a diabetic    Nursing Notes were all reviewed and agreed with or any disagreements were addressed  in the HPI.    REVIEW OF SYSTEMS    (2-9 systems for level 4, 10 or more for level 5)     Review of Systems    Positives and Pertinent negatives as per HPI.  Except as noted above in the ROS, all other systems were reviewed andnegative.       PASTMEDICAL HISTORY     Past Medical History:   Diagnosis Date    Chronic chest pain     Migraines          SURGICAL HISTORY     No past surgical history on file.      CURRENT MEDICATIONS       Previous Medications    IBUPROFEN (IBU) 600 MG TABLET    Take 1 tablet by mouth every 8 hours as needed for Pain or Fever (with food)       ALLERGIES     Patient has no known allergies.    FAMILY HISTORY     No family history on file.       SOCIAL HISTORY       Social History     Socioeconomic History    Marital status: Single   Tobacco Use    Smoking status: Every Day     Current packs/day: 1.00     Types: Cigarettes    Smokeless tobacco: Never    Tobacco comments:     daily   Substance and Sexual Activity    Alcohol use: Yes     Comment: 2 times week    Drug use: Yes     Types: Marijuana (Weed)     Comment: every other day    Sexual activity: Yes     Partners: Female       SCREENINGS      @FLOW([id number])@      PHYSICAL EXAM    (up to 7 for level 4, 8

## 2024-10-17 ENCOUNTER — HOSPITAL ENCOUNTER (EMERGENCY)
Age: 34
Discharge: HOME | End: 2024-10-17
Attending: EMERGENCY MEDICINE
Payer: COMMERCIAL

## 2024-10-17 VITALS
RESPIRATION RATE: 18 BRPM | BODY MASS INDEX: 26.24 KG/M2 | OXYGEN SATURATION: 100 % | DIASTOLIC BLOOD PRESSURE: 78 MMHG | SYSTOLIC BLOOD PRESSURE: 150 MMHG | HEIGHT: 74 IN | TEMPERATURE: 98.2 F | WEIGHT: 204.5 LBS | HEART RATE: 51 BPM

## 2024-10-17 DIAGNOSIS — K04.7 DENTAL INFECTION: Primary | ICD-10-CM

## 2024-10-17 PROCEDURE — 99283 EMERGENCY DEPT VISIT LOW MDM: CPT

## 2024-10-17 RX ORDER — OXYCODONE HYDROCHLORIDE 5 MG/1
5 CAPSULE ORAL EVERY 8 HOURS PRN
COMMUNITY

## 2024-10-17 RX ORDER — GABAPENTIN 800 MG/1
800 TABLET ORAL 3 TIMES DAILY
COMMUNITY
Start: 2024-08-23

## 2024-10-17 RX ORDER — METHOCARBAMOL 750 MG/1
750 TABLET, FILM COATED ORAL 3 TIMES DAILY PRN
COMMUNITY
Start: 2024-08-23

## 2024-10-17 RX ORDER — OXYCODONE AND ACETAMINOPHEN 5; 325 MG/1; MG/1
1 TABLET ORAL EVERY 12 HOURS PRN
COMMUNITY
Start: 2024-08-23

## 2024-10-17 RX ORDER — CHLORHEXIDINE GLUCONATE ORAL RINSE 1.2 MG/ML
15 SOLUTION DENTAL 2 TIMES DAILY
Qty: 420 ML | Refills: 0 | Status: SHIPPED | OUTPATIENT
Start: 2024-10-17 | End: 2024-10-31

## 2024-10-17 RX ORDER — AMOXICILLIN 500 MG/1
500 CAPSULE ORAL 3 TIMES DAILY
Qty: 30 CAPSULE | Refills: 0 | Status: SHIPPED | OUTPATIENT
Start: 2024-10-17 | End: 2024-10-27

## 2024-10-17 ASSESSMENT — PAIN DESCRIPTION - DESCRIPTORS
DESCRIPTORS: ACHING
DESCRIPTORS: ACHING

## 2024-10-17 ASSESSMENT — PAIN DESCRIPTION - PAIN TYPE
TYPE: ACUTE PAIN
TYPE: ACUTE PAIN

## 2024-10-17 ASSESSMENT — PAIN DESCRIPTION - LOCATION
LOCATION: TEETH
LOCATION: TEETH

## 2024-10-17 ASSESSMENT — PAIN DESCRIPTION - FREQUENCY: FREQUENCY: CONTINUOUS

## 2024-10-17 ASSESSMENT — PAIN - FUNCTIONAL ASSESSMENT
PAIN_FUNCTIONAL_ASSESSMENT: 0-10
PAIN_FUNCTIONAL_ASSESSMENT: 0-10

## 2024-10-17 ASSESSMENT — PAIN SCALES - GENERAL
PAINLEVEL_OUTOF10: 7
PAINLEVEL_OUTOF10: 6

## 2024-10-17 NOTE — DISCHARGE INSTR - COC
Continuity of Care Form    Patient Name: Osmin Bales   :  1990  MRN:  [id number]    Admit date:  10/17/2024  Discharge date:  ***    Code Status Order: No Order   Advance Directives:   Advance Care Flowsheet Documentation             Admitting Physician:  No admitting provider for patient encounter.  PCP: No primary care provider on file.    Discharging Nurse: ***  Discharging Hospital Unit/Room#:   Discharging Unit Phone Number: ***    Emergency Contact:   Extended Emergency Contact Information  Primary Emergency Contact: Sue Cohen  Home Phone: [phone]  Relation: Parent    Past Surgical History:  Past Surgical History:   Procedure Laterality Date    FRACTURE SURGERY         Immunization History:     There is no immunization history on file for this patient.    Active Problems:  There is no problem list on file for this patient.      Isolation/Infection:   Isolation            No Isolation          Patient Infection Status       None to display            Nurse Assessment:  Last Vital Signs: BP (!) 150/78   Pulse 51   Temp 98.2 °F (36.8 °C) (Oral)   Resp 18   Ht 1.88 m (6' 2\")   Wt 92.8 kg (204 lb 8 oz)   SpO2 100%   BMI 26.26 kg/m²     Last documented pain score (0-10 scale): Pain Level: 7  Last Weight:   Wt Readings from Last 1 Encounters:   10/17/24 92.8 kg (204 lb 8 oz)     Mental Status:  {IP PT MENTAL STATUS:68066}    IV Access:  { URBAN IV ACCESS:069033073}    Nursing Mobility/ADLs:  Walking   {CHP DME ADLs:750331923}  Transfer  {CHP DME ADLs:521091144}  Bathing  {CHP DME ADLs:209135948}  Dressing  {CHP DME ADLs:979886405}  Toileting  {CHP DME ADLs:462507859}  Feeding  {CHP DME ADLs:433273272}  Med Admin  {CHP DME ADLs:648824817}  Med Delivery   { URBAN MED Delivery:021669280}    Wound Care Documentation and Therapy:        Elimination:  Continence:   Bowel: {YES / NO:}  Bladder: {YES / NO:}  Urinary Catheter: {Urinary Catheter:258344004}
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
## 2024-10-17 NOTE — DISCHARGE INSTRUCTIONS
[place] [id number]  [phone]    [facility]  [address]  [phone]     [facility]   [address] [place] [address]  [phone] ext. [phone]    [facility]  [address] [place] [address] [id number] [phone] or [phone]  (Must qualify)